# Patient Record
(demographics unavailable — no encounter records)

---

## 2024-10-25 NOTE — DISCUSSION/SUMMARY
[Surgical risks reviewed] : Surgical risks reviewed [de-identified] : reviewed the case and the imaging with the patient  cervical Mri REVIEWED discussion of the condition and treatment options cautions discussed questions answered discussion of natural history of the condition and what the next step would be time to heal  fu 3 months

## 2024-10-25 NOTE — HISTORY OF PRESENT ILLNESS
[Neck] : neck [0] : 0 [Dull/Aching] : dull/aching [Localized] : localized [de-identified] : 6/26/23:  66 yo M with left sided neck radiates to the left shoulder - no loss of fine motor - RHD - no prior episodes - symptoms getting worse - hard to sleep   No prior neck surgery  afib - on asa  No loss of bb control   xrays today: C spine - spondylosis   retired   5x shoulder operatoins on the right side knee surgery recently with Dr Tatum doing very well with that - is in PT now  /lumbar fusion 2017 - did well with that /  7/17/23: Here for fu to review the MRI - plan at last was "reviewed the case/imaging with him - xrays look okay - clincially has left C3/4 cervical radiculopathy  discussion of optoins - at this point he is indicated for MRi C spine and MDP/PT  would like him to fu to review the MRi" - overall the pain remains in the left side with numbness to the top of the shoulder on the left side - has beeN IN THE PT which helps a bit - no loss of fine motor   MRI C-spine 6/27/23 1. Multilevel loss of disc signal and height most noted C6-C7 with Modic type 2 endplate change and no  fracture. 2. Schmorl's nodes with Modic type 1 endplate change posterior inferior C3. 3. C1-C2: Arthrosis with capsular thickening. 4. C2-C3: Bulge with central herniation. 5. C3-C4: Bulge with broad central herniation impressing on the thecal sac with contact of the anterior cord  with borderline central stenosis. Luschka hypertrophy and facet arthrosis with foraminal stenosis. 6. C4-C5: Grade 1 anterior spondylolisthesis. Bulge with broad herniation impressing on the thecal sac.  Luschka hypertrophy and facet arthrosis with foraminal stenosis. 7. C5-C6: Bulge with broad central and asymmetric to right herniation impressing on the thecal sac. Luschka  hypertrophy, facet arthrosis with right foraminal stenosis.  9/15/23 here for fu. Patient reports continued sharp neck and "clicking." No longer in PT 2/2 lack of insurance coverage. Patient reported PT was helping, now doing HEP. interested in other options   11/17/23 here for fu.  Patient reports increased neck clicking and radicular pain left sided since last week of PT in September.  Feels something is different than when pain first occurred. He is not taking anything for pain.  Radiates to the right shoulder and down  also the left shoulder  Seems to have been worse since PT   12/4/23: HEre for fu - pain continues in the same capacity   Has done 2x ROSANNA and 2X tpi  The PT made everything worse  reviewed the MRi and the xrays  01/19/2024: Here for 1st visit s/p C3-5 ACDF on 1/4/24 - overall patient is managing well - wearing brace - denies F/C.  X-rays today: C spine 2v - implants in good position  2/12/24: Here to follow up PO ACDF C3-6 on 1/4/24. Doing well. No pain, no parestehias. Still diff swallowing although improving. Wearing collar. arms okay  xrays today C spine - implants in good position  3/25/24: here for post op f/u  swallowing better  speech better  not too much pain  using bone stim  xrays today C spine - implant in good position   5/13/24: Here for post-op fu - doing better with time - neck remains stiff/sore. Swallowing and the speech continue to improve. He continues to use to bone stimulator. He has been doing home exercises and stretching. No loss of fine motor motions.   7/8/24 Patient is here for neck pain. Patient states he has had pain since August of last year due to physical therapy traction. Patient states he has a sharp pain to the back of the head when he turns his neck. He is still using the bone stimulator. No radiation of pain into the arms  xrays today C-spine - implants in good position   7.31.24 Patient here for neck pain. Here to review MRI- NECK PAIN REMAINS   MRI C spine - see report  -OCOA   10/25/24: HEre now about 9 months out from acdf  neck stiff  hands okay  xrays today: C spine - healed acdf  [] : Post Surgical Visit: no [FreeTextEntry5] : reports no pain today.  [de-identified] : 01/04/24 [de-identified] : ACDF C3-6

## 2024-11-04 NOTE — REASON FOR VISIT
[Follow-Up - Clinic] : a clinic follow-up of [FreeTextEntry2] : pt had paf 2014(shoveling snow), 2018, saw Sushila brown, ablations both times, felt heart racing, no sscp or sob or palps since. pt now for f/u, no cardiac sx, last week brief HR races, better now, occasional light headed if stands quick [FreeTextEntry1] : pt cant run, right groin pain

## 2024-11-04 NOTE — PHYSICAL EXAM
[General Appearance - Well Developed] : well developed [Normal Appearance] : normal appearance [Well Groomed] : well groomed [General Appearance - Well Nourished] : well nourished [No Deformities] : no deformities [General Appearance - In No Acute Distress] : no acute distress [Normal Conjunctiva] : the conjunctiva exhibited no abnormalities [Eyelids - No Xanthelasma] : the eyelids demonstrated no xanthelasmas [Normal Oral Mucosa] : normal oral mucosa [No Oral Pallor] : no oral pallor [No Oral Cyanosis] : no oral cyanosis [Normal Jugular Venous A Waves Present] : normal jugular venous A waves present [Normal Jugular Venous V Waves Present] : normal jugular venous V waves present [No Jugular Venous Newman A Waves] : no jugular venous newman A waves [Nail Clubbing] : no clubbing of the fingernails [Cyanosis, Localized] : no localized cyanosis [Petechial Hemorrhages (___cm)] : no petechial hemorrhages [Skin Color & Pigmentation] : normal skin color and pigmentation [] : no rash [No Venous Stasis] : no venous stasis [Skin Lesions] : no skin lesions [No Skin Ulcers] : no skin ulcer [No Xanthoma] : no  xanthoma was observed [Oriented To Time, Place, And Person] : oriented to person, place, and time [Affect] : the affect was normal [Mood] : the mood was normal [No Anxiety] : not feeling anxious [FreeTextEntry1] : pt with knee, back and groin issues, cant walk fast

## 2024-11-04 NOTE — DISCUSSION/SUMMARY
[Paroxysmal Atrial Fibrillation] : paroxysmal atrial fibrillation [Stable] : stable [Echocardiogram] : echocardiogram [Patient] : the patient [Hypertension] : hypertension [Outpatient Evaluation] : outpatient evaluation [Ambulatory BP Monitoring] : ambulatory blood pressure monitoring [None] : There are no changes in medication management [Low Sodium Diet] : low sodium diet [de-identified] : pt self d/cd eliquis due to cost, he takes asa instead, understands inc cva risk [de-identified] : abnl NST, see cardiac cta 11/2021 mild dz [de-identified] : bp better at home on machine, hi salt recent [de-identified] : [facility], pt defers for diet chg

## 2025-01-24 NOTE — DISCUSSION/SUMMARY
[Surgical risks reviewed] : Surgical risks reviewed [de-identified] : reviewed the case and the imaging with the patient  cervical Mri REVIEWED again today persistent pain  consider pain injection  discussion of the condition and treatment options cautions discussed questions answered discussion of natural history of the condition and what the next step consider pain injection fu 3 months

## 2025-05-12 NOTE — REASON FOR VISIT
[Follow-Up - Clinic] : a clinic follow-up of [FreeTextEntry2] : pt had paf 2014(sholucitaing snow), 2018, saw Sushila brown, ablations , now fine, no palps

## 2025-05-12 NOTE — PHYSICAL EXAM
[General Appearance - Well Developed] : well developed [Normal Appearance] : normal appearance [Well Groomed] : well groomed [General Appearance - Well Nourished] : well nourished [No Deformities] : no deformities [General Appearance - In No Acute Distress] : no acute distress [Normal Conjunctiva] : the conjunctiva exhibited no abnormalities [Eyelids - No Xanthelasma] : the eyelids demonstrated no xanthelasmas [Normal Oral Mucosa] : normal oral mucosa [No Oral Pallor] : no oral pallor [No Oral Cyanosis] : no oral cyanosis [Normal Jugular Venous A Waves Present] : normal jugular venous A waves present [Normal Jugular Venous V Waves Present] : normal jugular venous V waves present [No Jugular Venous Newman A Waves] : no jugular venous newman A waves [FreeTextEntry1] : pt with knee, back and groin issues, cant walk fast [Nail Clubbing] : no clubbing of the fingernails [Cyanosis, Localized] : no localized cyanosis [Petechial Hemorrhages (___cm)] : no petechial hemorrhages [Skin Color & Pigmentation] : normal skin color and pigmentation [] : no rash [No Venous Stasis] : no venous stasis [Skin Lesions] : no skin lesions [No Skin Ulcers] : no skin ulcer [No Xanthoma] : no  xanthoma was observed [Oriented To Time, Place, And Person] : oriented to person, place, and time [Affect] : the affect was normal [Mood] : the mood was normal [No Anxiety] : not feeling anxious

## 2025-05-12 NOTE — DISCUSSION/SUMMARY
[Paroxysmal Atrial Fibrillation] : paroxysmal atrial fibrillation [Stable] : stable [Echocardiogram] : echocardiogram [Patient] : the patient [Hypertension] : hypertension [Outpatient Evaluation] : outpatient evaluation [Ambulatory BP Monitoring] : ambulatory blood pressure monitoring [None] : There are no changes in medication management [Low Sodium Diet] : low sodium diet [Minutes Spent: ___] : for [unfilled] ~Uminutes [de-identified] : pt self d/cd eliquis due to cost, he takes asa instead, understands inc cva risk [de-identified] : abnl NST, see cardiac cta 11/2021 mild dz [de-identified] : bp better at home on machine,  [de-identified] : [facility], pt defers for diet chg [FreeTextEntry2] : calibrated home bp machine